# Patient Record
Sex: FEMALE | Race: WHITE | URBAN - METROPOLITAN AREA
[De-identification: names, ages, dates, MRNs, and addresses within clinical notes are randomized per-mention and may not be internally consistent; named-entity substitution may affect disease eponyms.]

---

## 2022-05-09 ENCOUNTER — APPOINTMENT (RX ONLY)
Dept: URBAN - METROPOLITAN AREA CLINIC 79 | Facility: CLINIC | Age: 22
Setting detail: DERMATOLOGY
End: 2022-05-09

## 2022-05-09 DIAGNOSIS — D22 MELANOCYTIC NEVI: ICD-10-CM

## 2022-05-09 DIAGNOSIS — L74.51 PRIMARY FOCAL HYPERHIDROSIS: ICD-10-CM

## 2022-05-09 DIAGNOSIS — D18.0 HEMANGIOMA: ICD-10-CM

## 2022-05-09 PROBLEM — D22.39 MELANOCYTIC NEVI OF OTHER PARTS OF FACE: Status: ACTIVE | Noted: 2022-05-09

## 2022-05-09 PROBLEM — L74.519 PRIMARY FOCAL HYPERHIDROSIS, UNSPECIFIED: Status: ACTIVE | Noted: 2022-05-09

## 2022-05-09 PROBLEM — D18.01 HEMANGIOMA OF SKIN AND SUBCUTANEOUS TISSUE: Status: ACTIVE | Noted: 2022-05-09

## 2022-05-09 PROBLEM — D22.72 MELANOCYTIC NEVI OF LEFT LOWER LIMB, INCLUDING HIP: Status: ACTIVE | Noted: 2022-05-09

## 2022-05-09 PROBLEM — D22.5 MELANOCYTIC NEVI OF TRUNK: Status: ACTIVE | Noted: 2022-05-09

## 2022-05-09 PROCEDURE — ? COUNSELING

## 2022-05-09 PROCEDURE — ? PRESCRIPTION

## 2022-05-09 PROCEDURE — ? PRESCRIPTION MEDICATION MANAGEMENT

## 2022-05-09 PROCEDURE — 99203 OFFICE O/P NEW LOW 30 MIN: CPT

## 2022-05-09 RX ORDER — GLYCOPYRROLATE 1 MG/1
1 TABLET ORAL QD
Qty: 30 | Refills: 2 | Status: ERX

## 2022-05-09 ASSESSMENT — LOCATION SIMPLE DESCRIPTION DERM
LOCATION SIMPLE: LEFT FOREHEAD
LOCATION SIMPLE: LEFT THIGH
LOCATION SIMPLE: UPPER BACK
LOCATION SIMPLE: LEFT NOSE

## 2022-05-09 ASSESSMENT — LOCATION DETAILED DESCRIPTION DERM
LOCATION DETAILED: LEFT NASAL ALA
LOCATION DETAILED: INFERIOR THORACIC SPINE
LOCATION DETAILED: LEFT ANTERIOR LATERAL PROXIMAL THIGH
LOCATION DETAILED: LEFT LATERAL FOREHEAD

## 2022-05-09 ASSESSMENT — LOCATION ZONE DERM
LOCATION ZONE: FACE
LOCATION ZONE: TRUNK
LOCATION ZONE: NOSE
LOCATION ZONE: LEG

## 2022-05-09 NOTE — PROCEDURE: MIPS QUALITY
Quality 110: Preventive Care And Screening: Influenza Immunization: Influenza Immunization not Administered because Patient Refused.
Quality 431: Preventive Care And Screening: Unhealthy Alcohol Use - Screening: Patient not identified as an unhealthy alcohol user when screened for unhealthy alcohol use using a systematic screening method
Detail Level: Detailed

## 2022-05-09 NOTE — PROCEDURE: PRESCRIPTION MEDICATION MANAGEMENT
Initiate Treatment: Glycopyrrolate 1 po qd
Render In Strict Bullet Format?: No
Detail Level: Zone
Plan: Drink lots of water

## 2022-05-09 NOTE — HPI: OTHER
Condition:: Sweating
Please Describe Your Condition:: The problem areas are mainly under the arms but occasionally the palm. Patient states that even sitting in the air conditioning she will sweat.

## 2022-11-21 ENCOUNTER — APPOINTMENT (RX ONLY)
Dept: URBAN - METROPOLITAN AREA CLINIC 79 | Facility: CLINIC | Age: 22
Setting detail: DERMATOLOGY
End: 2022-11-21

## 2022-11-21 DIAGNOSIS — B07.8 OTHER VIRAL WARTS: ICD-10-CM

## 2022-11-21 DIAGNOSIS — L74.51 PRIMARY FOCAL HYPERHIDROSIS: ICD-10-CM | Status: INADEQUATELY CONTROLLED

## 2022-11-21 PROBLEM — L74.519 PRIMARY FOCAL HYPERHIDROSIS, UNSPECIFIED: Status: ACTIVE | Noted: 2022-11-21

## 2022-11-21 PROCEDURE — ? COUNSELING

## 2022-11-21 PROCEDURE — ? LIQUID NITROGEN

## 2022-11-21 PROCEDURE — 99213 OFFICE O/P EST LOW 20 MIN: CPT | Mod: 25

## 2022-11-21 PROCEDURE — ? PRESCRIPTION MEDICATION MANAGEMENT

## 2022-11-21 PROCEDURE — 17110 DESTRUCTION B9 LES UP TO 14: CPT

## 2022-11-21 PROCEDURE — ? PRESCRIPTION

## 2022-11-21 RX ORDER — GLYCOPYRROLATE 1 MG/1
1 TABLET ORAL QD
Qty: 60 | Refills: 2 | Status: ERX

## 2022-11-21 RX ORDER — GLYCOPYRRONIUM 2.4 G/100G
1 CLOTH TOPICAL NIGHTLY
Qty: 30 | Refills: 2 | Status: ERX

## 2022-11-21 ASSESSMENT — LOCATION ZONE DERM: LOCATION ZONE: HAND

## 2022-11-21 ASSESSMENT — LOCATION DETAILED DESCRIPTION DERM: LOCATION DETAILED: LEFT ULNAR PALM

## 2022-11-21 ASSESSMENT — LOCATION SIMPLE DESCRIPTION DERM: LOCATION SIMPLE: LEFT HAND

## 2022-11-21 NOTE — PROCEDURE: LIQUID NITROGEN
Pared With?: curette
Consent: The patient's consent was obtained including but not limited to risks of crusting, scabbing, blistering, scarring, darker or lighter pigmentary change, recurrence, incomplete removal and infection.
Medical Necessity Information: It is in your best interest to select a reason for this procedure from the list below. All of these items fulfill various CMS LCD requirements except the new and changing color options.
Show Topical Anesthesia Variable?: Yes
Detail Level: Detailed
Application Tool (Optional): Cry-AC
Post-Care Instructions: I reviewed with the patient in detail post-care instructions. Patient is to wear sunprotection, and avoid picking at any of the treated lesions. Pt may apply Vaseline to crusted or scabbing areas.
Spray Paint Text: The liquid nitrogen was applied to the skin utilizing a spray paint frosting technique.
Include Z78.9 (Other Specified Conditions Influencing Health Status) As An Associated Diagnosis?: No
Number Of Freeze-Thaw Cycles: 2 freeze-thaw cycles
Medical Necessity Clause: This procedure was medically necessary because the lesions that were treated were:

## 2022-11-29 ENCOUNTER — RX ONLY (OUTPATIENT)
Age: 22
Setting detail: RX ONLY
End: 2022-11-29

## 2022-11-29 RX ORDER — GLYCOPYRROLATE 1 MG/1
1 TABLET ORAL QD
Qty: 180 | Refills: 1 | Status: ERX

## 2022-12-21 ENCOUNTER — APPOINTMENT (RX ONLY)
Dept: URBAN - METROPOLITAN AREA CLINIC 79 | Facility: CLINIC | Age: 22
Setting detail: DERMATOLOGY
End: 2022-12-21

## 2022-12-21 ENCOUNTER — RX ONLY (OUTPATIENT)
Age: 22
Setting detail: RX ONLY
End: 2022-12-21

## 2022-12-21 DIAGNOSIS — B07.8 OTHER VIRAL WARTS: ICD-10-CM

## 2022-12-21 DIAGNOSIS — L74.51 PRIMARY FOCAL HYPERHIDROSIS: ICD-10-CM

## 2022-12-21 PROBLEM — L74.519 PRIMARY FOCAL HYPERHIDROSIS, UNSPECIFIED: Status: ACTIVE | Noted: 2022-12-21

## 2022-12-21 PROCEDURE — ? PRESCRIPTION

## 2022-12-21 PROCEDURE — 99213 OFFICE O/P EST LOW 20 MIN: CPT

## 2022-12-21 PROCEDURE — ? PRESCRIPTION MEDICATION MANAGEMENT

## 2022-12-21 PROCEDURE — ? COUNSELING

## 2022-12-21 RX ORDER — GLYCOPYRROLATE 1 MG/1
1 TABLET ORAL QD
Qty: 90 | Refills: 2 | Status: ERX

## 2022-12-21 RX ORDER — GLYCOPYRRONIUM 2.4 G/100G
1 CLOTH TOPICAL NIGHTLY
Qty: 30 | Refills: 2 | Status: ERX

## 2022-12-21 ASSESSMENT — LOCATION DETAILED DESCRIPTION DERM: LOCATION DETAILED: LEFT ULNAR PALM

## 2022-12-21 ASSESSMENT — LOCATION ZONE DERM: LOCATION ZONE: HAND

## 2022-12-21 ASSESSMENT — LOCATION SIMPLE DESCRIPTION DERM: LOCATION SIMPLE: LEFT HAND

## 2022-12-21 NOTE — PROCEDURE: PRESCRIPTION MEDICATION MANAGEMENT
Continue Regimen: Increase Glycopyrolate to tid
Render In Strict Bullet Format?: No
Detail Level: Zone
Plan: Drink lots of water

## 2023-12-07 ENCOUNTER — APPOINTMENT (RX ONLY)
Dept: URBAN - METROPOLITAN AREA CLINIC 79 | Facility: CLINIC | Age: 23
Setting detail: DERMATOLOGY
End: 2023-12-07

## 2023-12-07 DIAGNOSIS — L74.51 PRIMARY FOCAL HYPERHIDROSIS: ICD-10-CM | Status: WELL CONTROLLED

## 2023-12-07 PROBLEM — L74.519 PRIMARY FOCAL HYPERHIDROSIS, UNSPECIFIED: Status: ACTIVE | Noted: 2023-12-07

## 2023-12-07 PROCEDURE — ? PRESCRIPTION

## 2023-12-07 PROCEDURE — ? COUNSELING

## 2023-12-07 PROCEDURE — ? PRESCRIPTION MEDICATION MANAGEMENT

## 2023-12-07 PROCEDURE — 99213 OFFICE O/P EST LOW 20 MIN: CPT

## 2023-12-07 RX ORDER — GLYCOPYRROLATE 1 MG/1
1 TABLET ORAL QD
Qty: 90 | Refills: 11 | Status: CANCELLED

## 2023-12-07 NOTE — PROCEDURE: PRESCRIPTION MEDICATION MANAGEMENT
Continue Regimen: Glycopyrolate 1 MG 2 QD
Render In Strict Bullet Format?: No
Detail Level: Zone
Plan: Drink lots of water

## 2023-12-11 ENCOUNTER — RX ONLY (OUTPATIENT)
Age: 23
Setting detail: RX ONLY
End: 2023-12-11

## 2023-12-11 RX ORDER — GLYCOPYRROLATE 1 MG/1
1 TABLET ORAL QD
Qty: 270 | Refills: 2 | Status: ERX